# Patient Record
Sex: FEMALE | Race: BLACK OR AFRICAN AMERICAN | URBAN - METROPOLITAN AREA
[De-identification: names, ages, dates, MRNs, and addresses within clinical notes are randomized per-mention and may not be internally consistent; named-entity substitution may affect disease eponyms.]

---

## 2018-11-21 ENCOUNTER — OFFICE VISIT (OUTPATIENT)
Dept: OBGYN CLINIC | Age: 23
End: 2018-11-21

## 2018-11-21 ENCOUNTER — HOSPITAL ENCOUNTER (OUTPATIENT)
Dept: LAB | Age: 23
Discharge: HOME | End: 2018-11-21
Payer: COMMERCIAL

## 2018-11-21 VITALS
RESPIRATION RATE: 20 BRPM | DIASTOLIC BLOOD PRESSURE: 79 MMHG | WEIGHT: 160.4 LBS | SYSTOLIC BLOOD PRESSURE: 113 MMHG | BODY MASS INDEX: 28.42 KG/M2 | HEART RATE: 87 BPM | HEIGHT: 63 IN | OXYGEN SATURATION: 100 %

## 2018-11-21 DIAGNOSIS — Z30.09 FAMILY PLANNING: ICD-10-CM

## 2018-11-21 DIAGNOSIS — Z01.419 WELL WOMAN EXAM WITH ROUTINE GYNECOLOGICAL EXAM: Primary | ICD-10-CM

## 2018-11-21 DIAGNOSIS — Z30.49 SURVEILLANCE FOR CONTRACEPTION BARRIER OR SPERMICIDE: ICD-10-CM

## 2018-11-21 PROCEDURE — 88175 CYTOPATH C/V AUTO FLUID REDO: CPT

## 2018-11-21 NOTE — PROGRESS NOTES
Subjective:   20 y.o. female for Well Woman Check. Patient's last menstrual period was 08/19/2018. Social History: single partner, contraception - Nexplanon  Pertinent past medical hstory: no history of HTN, DVT, CAD, DM, liver disease, migraines or smoking. There is no problem list on file for this patient. There are no active problems to display for this patient. Current Outpatient Medications   Medication Sig Dispense Refill    etonogestrel (NEXPLANON) 68 mg impl by SubDERmal route. No Known Allergies  History reviewed. No pertinent past medical history. Past Surgical History:   Procedure Laterality Date    HX REFRACTIVE SURGERY       Family History   Problem Relation Age of Onset    Breast Cancer Maternal Grandmother      Social History     Tobacco Use    Smoking status: Never Smoker    Smokeless tobacco: Never Used   Substance Use Topics    Alcohol use: Yes     Frequency: Never     Comment: occasionlly        ROS:  Feeling well. No dyspnea or chest pain on exertion. No abdominal pain, change in bowel habits, black or bloody stools. No urinary tract symptoms. GYN ROS: normal menses, no abnormal bleeding, pelvic pain or discharge, no breast pain or new or enlarging lumps on self exam. No neurological complaints. Objective:     Visit Vitals  /79   Pulse 87   Resp 20   Ht 5' 3\" (1.6 m)   Wt 160 lb 6.4 oz (72.8 kg)   LMP 08/19/2018   SpO2 100%   BMI 28.41 kg/m²     The patient appears well, alert, oriented x 3, in no distress. ENT normal.  Neck supple. No adenopathy or thyromegaly. LEANDRO. Lungs are clear, good air entry, no wheezes, rhonchi or rales. S1 and S2 normal, no murmurs, regular rate and rhythm. Abdomen soft without tenderness, guarding, mass or organomegaly. Extremities show no edema, normal peripheral pulses. Neurological is normal, no focal findings. Nexplanon easily palpable in LUE.     BREAST EXAM: breasts appear normal, no suspicious masses, no skin or nipple changes or axillary nodes    PELVIC EXAM: normal external genitalia, vulva, vagina, cervix, uterus and adnexa    Assessment/Plan:   well woman  no contraindication to continue hormonal therapy  pap smear  return annually or prn    ICD-10-CM ICD-9-CM    1. Well woman exam with routine gynecological exam Z01.419 V72.31 PAP IG, RFX HPV ASCU, 57&09,43([id number])   2. Family planning Z30.09 V25.09 PAP IG, RFX HPV ASCU, 16&18,45([id number])   3. Surveillance for contraception barrier or spermicide Z30.49 V25.49      Encounter Diagnoses   Name Primary?  Well woman exam with routine gynecological exam Yes    Family planning     Surveillance for contraception barrier or spermicide      Orders Placed This Encounter    etonogestrel (NEXPLANON) 68 mg impl    PAP IG, RFX HPV ASCU, 14&97,99([id number])     Follow-up Disposition:  Return in about 1 year (around 11/21/2019) for Annual Exam or prn. Zayda Estes